# Patient Record
Sex: FEMALE | Race: BLACK OR AFRICAN AMERICAN | NOT HISPANIC OR LATINO | URBAN - METROPOLITAN AREA
[De-identification: names, ages, dates, MRNs, and addresses within clinical notes are randomized per-mention and may not be internally consistent; named-entity substitution may affect disease eponyms.]

---

## 2018-06-29 ENCOUNTER — OUTPATIENT (OUTPATIENT)
Dept: OUTPATIENT SERVICES | Facility: HOSPITAL | Age: 58
LOS: 1 days | End: 2018-06-29
Payer: COMMERCIAL

## 2018-06-29 DIAGNOSIS — R06.02 SHORTNESS OF BREATH: ICD-10-CM

## 2018-06-29 PROCEDURE — A9505: CPT

## 2018-06-29 PROCEDURE — 78452 HT MUSCLE IMAGE SPECT MULT: CPT

## 2018-06-29 PROCEDURE — 93018 CV STRESS TEST I&R ONLY: CPT

## 2018-06-29 PROCEDURE — 93017 CV STRESS TEST TRACING ONLY: CPT

## 2018-06-29 PROCEDURE — 78452 HT MUSCLE IMAGE SPECT MULT: CPT | Mod: 26

## 2018-06-29 PROCEDURE — A9500: CPT

## 2018-06-29 PROCEDURE — 93016 CV STRESS TEST SUPVJ ONLY: CPT

## 2022-04-11 ENCOUNTER — TRANSCRIPTION ENCOUNTER (OUTPATIENT)
Age: 62
End: 2022-04-11

## 2022-04-11 VITALS
OXYGEN SATURATION: 100 % | HEART RATE: 72 BPM | TEMPERATURE: 98 F | DIASTOLIC BLOOD PRESSURE: 83 MMHG | RESPIRATION RATE: 18 BRPM | WEIGHT: 293 LBS | HEIGHT: 69 IN | SYSTOLIC BLOOD PRESSURE: 148 MMHG

## 2022-04-12 ENCOUNTER — TRANSCRIPTION ENCOUNTER (OUTPATIENT)
Age: 62
End: 2022-04-12

## 2022-04-12 ENCOUNTER — RESULT REVIEW (OUTPATIENT)
Age: 62
End: 2022-04-12

## 2022-04-12 ENCOUNTER — OUTPATIENT (OUTPATIENT)
Dept: OUTPATIENT SERVICES | Facility: HOSPITAL | Age: 62
LOS: 1 days | Discharge: HOME | End: 2022-04-12
Payer: COMMERCIAL

## 2022-04-12 VITALS
HEART RATE: 65 BPM | OXYGEN SATURATION: 100 % | RESPIRATION RATE: 20 BRPM | DIASTOLIC BLOOD PRESSURE: 68 MMHG | SYSTOLIC BLOOD PRESSURE: 134 MMHG

## 2022-04-12 DIAGNOSIS — Z98.891 HISTORY OF UTERINE SCAR FROM PREVIOUS SURGERY: Chronic | ICD-10-CM

## 2022-04-12 LAB
BLD GP AB SCN SERPL QL: NEGATIVE — SIGNIFICANT CHANGE UP
RH IG SCN BLD-IMP: POSITIVE — SIGNIFICANT CHANGE UP

## 2022-04-12 PROCEDURE — 86900 BLOOD TYPING SEROLOGIC ABO: CPT

## 2022-04-12 PROCEDURE — 88305 TISSUE EXAM BY PATHOLOGIST: CPT | Mod: 26

## 2022-04-12 PROCEDURE — 88305 TISSUE EXAM BY PATHOLOGIST: CPT

## 2022-04-12 PROCEDURE — 58558 HYSTEROSCOPY BIOPSY: CPT

## 2022-04-12 PROCEDURE — 86850 RBC ANTIBODY SCREEN: CPT

## 2022-04-12 PROCEDURE — 86901 BLOOD TYPING SEROLOGIC RH(D): CPT

## 2022-04-12 DEVICE — MYOSURE TISSUE REMOVAL DEVICE REACH: Type: IMPLANTABLE DEVICE | Status: FUNCTIONAL

## 2022-04-12 DEVICE — MYOSURE TISSUE REMOVAL DEVICE XL: Type: IMPLANTABLE DEVICE | Status: FUNCTIONAL

## 2022-04-12 RX ORDER — OXYCODONE HYDROCHLORIDE 5 MG/1
10 TABLET ORAL EVERY 4 HOURS
Refills: 0 | Status: DISCONTINUED | OUTPATIENT
Start: 2022-04-12 | End: 2022-04-12

## 2022-04-12 RX ORDER — ONDANSETRON 8 MG/1
8 TABLET, FILM COATED ORAL EVERY 8 HOURS
Refills: 0 | Status: DISCONTINUED | OUTPATIENT
Start: 2022-04-12 | End: 2022-04-12

## 2022-04-12 RX ORDER — METOCLOPRAMIDE HCL 10 MG
10 TABLET ORAL EVERY 8 HOURS
Refills: 0 | Status: DISCONTINUED | OUTPATIENT
Start: 2022-04-12 | End: 2022-04-12

## 2022-04-12 RX ORDER — OXYCODONE HYDROCHLORIDE 5 MG/1
5 TABLET ORAL EVERY 4 HOURS
Refills: 0 | Status: DISCONTINUED | OUTPATIENT
Start: 2022-04-12 | End: 2022-04-12

## 2022-04-12 RX ORDER — ACETAMINOPHEN 500 MG
2 TABLET ORAL
Qty: 0 | Refills: 0 | DISCHARGE
Start: 2022-04-12

## 2022-04-12 RX ORDER — SIMETHICONE 80 MG/1
80 TABLET, CHEWABLE ORAL EVERY 6 HOURS
Refills: 0 | Status: DISCONTINUED | OUTPATIENT
Start: 2022-04-12 | End: 2022-04-12

## 2022-04-12 RX ORDER — ATENOLOL AND CHLORTHALIDONE 50; 25 MG/1; MG/1
1 TABLET ORAL
Qty: 0 | Refills: 0 | DISCHARGE

## 2022-04-12 RX ORDER — SODIUM CHLORIDE 9 MG/ML
1000 INJECTION, SOLUTION INTRAVENOUS
Refills: 0 | Status: DISCONTINUED | OUTPATIENT
Start: 2022-04-12 | End: 2022-04-12

## 2022-04-12 RX ORDER — KETOROLAC TROMETHAMINE 30 MG/ML
30 SYRINGE (ML) INJECTION EVERY 6 HOURS
Refills: 0 | Status: DISCONTINUED | OUTPATIENT
Start: 2022-04-12 | End: 2022-04-12

## 2022-04-12 RX ORDER — HYDROMORPHONE HYDROCHLORIDE 2 MG/ML
0.5 INJECTION INTRAMUSCULAR; INTRAVENOUS; SUBCUTANEOUS
Refills: 0 | Status: DISCONTINUED | OUTPATIENT
Start: 2022-04-12 | End: 2022-04-12

## 2022-04-12 RX ORDER — ACETAMINOPHEN 500 MG
1000 TABLET ORAL EVERY 6 HOURS
Refills: 0 | Status: DISCONTINUED | OUTPATIENT
Start: 2022-04-12 | End: 2022-04-12

## 2022-04-12 RX ADMIN — Medication 30 MILLIGRAM(S): at 17:04

## 2022-04-12 NOTE — PACU DISCHARGE NOTE - COMMENTS
Patient denies pain at this time. IV removed as per protocol. PACU Discharge criteria met. Patient is ready for discharge. Given discharge instructions via teach back, verbalized understanding. Patient left in stable condition accompanied by family via wheelchair. Safety maintained at all times.

## 2022-04-12 NOTE — ASU DISCHARGE PLAN (ADULT/PEDIATRIC) - CALL YOUR DOCTOR IF YOU HAVE ANY OF THE FOLLOWING:
Bleeding that does not stop/Fever greater than (need to indicate Fahrenheit or Celsius)/Nausea and vomiting that does not stop/Unable to urinate/Excessive diarrhea/Inability to tolerate liquids or foods

## 2022-04-12 NOTE — DISCHARGE NOTE NURSING/CASE MANAGEMENT/SOCIAL WORK - PATIENT PORTAL LINK FT
You can access the FollowMyHealth Patient Portal offered by [facility] by registering at the following website: [email or web address]. By joining [facility]’s FollowMyHealth portal, you will also be able to view your health information using other applications (apps) compatible with our system.

## 2022-04-12 NOTE — ASU DISCHARGE PLAN (ADULT/PEDIATRIC) - NS MD DC FALL RISK RISK
For information on Fall & Injury Prevention, visit: [email or web address] OR  [email or web address] OR  https://www.cdc.gov/steadi/patient.html

## 2022-04-12 NOTE — ASU DISCHARGE PLAN (ADULT/PEDIATRIC) - CARE PROVIDER_API CALL
Gabriel Nelson)  Obstetrics and Gynecology  [address], [address]  [place], [place] [address]  Phone: [phone]  Fax: [phone]  Follow Up Time:

## 2022-04-12 NOTE — BRIEF OPERATIVE NOTE - NSICDXBRIEFPOSTOP_GEN_ALL_CORE_FT
POST-OP DIAGNOSIS:  Endometrial polyp 12-Apr-2022 16:26:57  Isidro Millan  Endocervical polyp 12-Apr-2022 16:27:13  Isidro Millan

## 2022-04-14 LAB — SURGICAL PATHOLOGY STUDY: SIGNIFICANT CHANGE UP

## 2022-11-18 PROBLEM — Z86.69 PERSONAL HISTORY OF OTHER DISEASES OF THE NERVOUS SYSTEM AND SENSE ORGANS: Chronic | Status: ACTIVE | Noted: 2022-04-11

## 2022-11-18 PROBLEM — I10 ESSENTIAL (PRIMARY) HYPERTENSION: Chronic | Status: ACTIVE | Noted: 2022-04-11

## 2022-12-12 PROBLEM — Z00.00 ENCOUNTER FOR PREVENTIVE HEALTH EXAMINATION: Status: ACTIVE | Noted: 2022-12-12

## 2022-12-19 ENCOUNTER — APPOINTMENT (OUTPATIENT)
Dept: ENDOCRINOLOGY | Facility: CLINIC | Age: 62
End: 2022-12-19

## 2022-12-19 VITALS
WEIGHT: 293 LBS | SYSTOLIC BLOOD PRESSURE: 169 MMHG | HEART RATE: 82 BPM | HEIGHT: 69 IN | BODY MASS INDEX: 43.4 KG/M2 | DIASTOLIC BLOOD PRESSURE: 91 MMHG

## 2022-12-19 DIAGNOSIS — Z82.49 FAMILY HISTORY OF ISCHEMIC HEART DISEASE AND OTHER DISEASES OF THE CIRCULATORY SYSTEM: ICD-10-CM

## 2022-12-19 PROCEDURE — 99202 OFFICE O/P NEW SF 15 MIN: CPT

## 2022-12-19 RX ORDER — ATENOLOL 50 MG/1
TABLET ORAL
Refills: 0 | Status: ACTIVE | COMMUNITY

## 2022-12-21 ENCOUNTER — TRANSCRIPTION ENCOUNTER (OUTPATIENT)
Age: 62
End: 2022-12-21

## 2023-01-06 ENCOUNTER — TRANSCRIPTION ENCOUNTER (OUTPATIENT)
Age: 63
End: 2023-01-06

## 2023-01-10 ENCOUNTER — TRANSCRIPTION ENCOUNTER (OUTPATIENT)
Age: 63
End: 2023-01-10

## 2023-01-19 ENCOUNTER — NON-APPOINTMENT (OUTPATIENT)
Age: 63
End: 2023-01-19

## 2023-01-20 ENCOUNTER — TRANSCRIPTION ENCOUNTER (OUTPATIENT)
Age: 63
End: 2023-01-20

## 2023-01-23 ENCOUNTER — TRANSCRIPTION ENCOUNTER (OUTPATIENT)
Age: 63
End: 2023-01-23

## 2023-02-01 ENCOUNTER — TRANSCRIPTION ENCOUNTER (OUTPATIENT)
Age: 63
End: 2023-02-01

## 2023-02-01 NOTE — ASSESSMENT
[FreeTextEntry1] : 62 year old female with PMH of HTN (atenolol) referred from PCP for obesity/weight management. \par \par 317lbs, BMI 47.\par Labs today.\par Trial of Ozempic. Samples provided. Consider switching to Wegovy if effective and tolerating at next visit, if Wegovy is back in supply and covered by plan.\par Referred to RD.\par \par RTO 2 months.\par Education:\par OZEMPIC may cause serious side effects, including:  Possible thyroid tumors, including cancer  \par The most common side effects of OZEMPIC may include nausea, vomiting, diarrhea, stomach (abdominal) pain and constipation. 	\par  Less common, but more serious side effects: inflammation of your pancreas (pancreatitis). Stop using OZEMPIC and call your healthcare provider right away if you have severe pain in your stomach area (abdomen) that will not go away, with or without vomiting. You may feel the pain from your abdomen to your back. \par Tell your healthcare provider if you have changes in vision, low blood sugar (hypoglycemia). \par \par Medication administration:\par Start by checking your pen to make sure that it contains Ozempic, then look at the pictures below to get to know the different parts of your pen and needle. Your pen is a prefilled dial-a-dose pen. It contains 2 mg of semaglutide, and you can select doses of 0.25 mg or 0.5 mg. Your pen is made to be used with NovoFine® Plus or NovoFine® disposable needles up to a length of 8 mm. NovoFine® Plus 32G 4 mm disposable needles are enclosed. Always use a new needle for each injection. 	\par Step 1:\par  --Wash your hands with soap and water. Check the name and colored label of your pen, to make sure that it contains Ozempic. This is especially important if you take more than 1 type of medicine. Pull off the pen cap. Check that Ozempic in your pen is clear and colorless.   	\par  --Take a new needle, and tear off the paper tab.  	\par  Push the needle straight onto the pen. Turn until it is on tight.  	\par  Pull off the outer needle cap. Do not throw it away.  \par Step 2:	\par --Check the Ozempic flow with each new pen 	\par Turn the dose selector until the dose counter shows the flow check symbol (..-).  Hold the pen with the needle pointing up.   Press and hold in the dose button until the dose counter shows 0. The 0 must line up with the dose pointer.  A drop of Ozempic will appear at the needle tip. If no drop appears, repeat Step 2 above up to 6 times.  \par Step 3:\par --Inject Medication	\par  Continue turning the dose selector until the dose counter shows your dose (0.25 mg or 0.5 mg).  	\par  Choose your injection site and wipe the skin with an alcohol swab. Let the injection site dry before you inject your dose.  	\par  Insert the needle into your skin as your healthcare provider has shown you. Make sure you can see the dose counter.  	\par  Press and hold down the dose button until the dose counter shows 0.  	\par  Keep the needle in your skin after the dose counter has returned to 0 and count slowly to 6.  	\par  Remove the needle from your skin.  	\par  Always watch the dose counter to know how many mg you inject. Hold the dose button down until the dose counter shows 0. 	\par  Carefully remove the needle from the pen.  	\par  Place the needle in a sharps container  	\par  Put the pen cap on your pen after each use to protect Ozempic from light.  	\par

## 2023-02-01 NOTE — ADDENDUM
[FreeTextEntry1] : 1/9/2023 AL\ronald Reviewed labs with patient over the phone.\par Hormone evaluation normal.\par A1C 5.7%. No family history of diabetes. Cholesterol slightly elevated.\par She has taken 3 doses of ozempic with mild SE, that have resolved. Appetite has decreased. With limited appetite, advised to ensure food eaten is nutritious and contains protein and fiber. Has RD appt. scheduled. Suspect A1C and cholesterol will improved with weight loss. Will see if Wegovy will be covered and supply available.\par RTO 4 weeks.\par \par 2/1/2023 AL\par Wegovy Approved thru 8/25/2023  #[id number].

## 2023-02-01 NOTE — HISTORY OF PRESENT ILLNESS
[FreeTextEntry1] : 62 year old female with PMH of HTN (atenolol) referred from PCP for obesity/weight management.\par \par She was not overweight as a child. She started gaining weight after pregnancy. She is at her highest weight now. 317lbs, BMI 47.\par She was seen by endocrinologist Dr Wheatley at age 47. Found to have insulin resistance. No pharmaceutic therapy at that time. Was advised to decrease carb intake. She endorses emotional eating. Has never tried a formal weight loss program like Noom or weight watchers, as she prefers to not measure portions.\par \par Diet: Breakfast - eggs, berries or 1/2 banana OR occasional corn muffin. Lunch - past - heaviest meal. Dinner - protein and salad. \par Exercise: 1hr 3x/week at gym - step aerobics and leg work with weights. \par \par FHx: no DM, thyroid, autoimmune. + HTN. Her  has DM and on Ozempic.

## 2023-02-02 ENCOUNTER — TRANSCRIPTION ENCOUNTER (OUTPATIENT)
Age: 63
End: 2023-02-02

## 2023-02-21 ENCOUNTER — APPOINTMENT (OUTPATIENT)
Dept: ENDOCRINOLOGY | Facility: CLINIC | Age: 63
End: 2023-02-21
Payer: COMMERCIAL

## 2023-02-21 VITALS
BODY MASS INDEX: 43.4 KG/M2 | SYSTOLIC BLOOD PRESSURE: 167 MMHG | DIASTOLIC BLOOD PRESSURE: 84 MMHG | HEART RATE: 86 BPM | WEIGHT: 293 LBS | HEIGHT: 69 IN

## 2023-02-21 PROCEDURE — 97802 MEDICAL NUTRITION INDIV IN: CPT

## 2023-02-21 PROCEDURE — 99213 OFFICE O/P EST LOW 20 MIN: CPT

## 2023-02-21 NOTE — HISTORY OF PRESENT ILLNESS
[FreeTextEntry1] : Ms. KINNEY is a 62 year female with pmhx of obesity, HTN who presents for follow-up.\par \par Last (initial) visit 12/19/2022 with Lea Longoria NP\par \par Interval change:\par Since last visit, wegovy PA obtained\par Was provided ozempic samples and started trial (12/24/22 initiation) with this first, titrating to 1mg/weekly dose (increased last saturday)\par Experiencing some upset with starting and then dose tiration that can last for 1.5 days after injection\par Was initially constipation as well, has increased fiber, which has helped\par Since last visit, has lost 12 lb from our scale, per patient closer to 8 based on home scales (starting weight 313)\par Saw nutritionist today\par \par \par Current regimen:\par \par 1. Obesity\par Yamila was not overweight as a child.  Began to gain weight after pregnancy.\par The patient's current BMI is: 45.04\par \par Previoulsy followed endocrinologist, Dr. Wheatley (when 47), dx with insulin resistance at that time.\par No medication prescribed, was advised dietary measures\par Endorses emotional eating\par \par Medications that increase body weight: NONE (antipsychotics, lithium, paroxetine, mirtazepine, valproate, gabapentin, carbamazepine, anti-histamines, B-blockers, glucocorticoids, progestins)\par \par Complications related to obesity: prediabetes (diabetes, prediabetes, metabolic syndrome, NAFLD, CHERELLE, dyslipidemia, HTN, osteoarthritis, depression)\par \par The patient has tried the following in attempts to lose weight: \par - dietary measures\par - not past formal weight loss program\par \par Current exercise: \par - exercising 3 days weekly\par \par That patient has met with nutritionist to discuss lifestyle changes\par

## 2023-02-21 NOTE — ASSESSMENT
[FreeTextEntry1] : 1. Obesity\par Obesity, class III\par Current weight 305 with BMI 45.04\par Highest weight 317 lb (BMI 47) in 12/2022\par SHANNEN has made dietary attempts, as well as exercise attempts for weight loss with limited benefit.  She initiated semaglutide (ozempic) since last visit and has titrated to 1mg as of this past week with +15lb weight loss and overall tolerance.  She does experience mild GI SE the 1-2 days after dose adjustment.\par -- plan to switch to wegovy 1.7mg/weekly after completing 4 weeks of 1mg regimen\par -- after 4 weeks of wegovy 1.7mg/weekly, to increase to maintenance dose of 2.4mg/weekly\par -- continue dietary modifications and increased physical activity\par \par Follow-up in 3 months, or sooner, as needed \par \par Elizabeth Gann, MS, FNP-BC, [facility]\par 02/21/2023\par

## 2023-03-15 ENCOUNTER — TRANSCRIPTION ENCOUNTER (OUTPATIENT)
Age: 63
End: 2023-03-15

## 2023-03-28 ENCOUNTER — TRANSCRIPTION ENCOUNTER (OUTPATIENT)
Age: 63
End: 2023-03-28

## 2023-07-04 ENCOUNTER — NON-APPOINTMENT (OUTPATIENT)
Age: 63
End: 2023-07-04

## 2023-07-05 ENCOUNTER — NON-APPOINTMENT (OUTPATIENT)
Age: 63
End: 2023-07-05

## 2023-07-06 ENCOUNTER — TRANSCRIPTION ENCOUNTER (OUTPATIENT)
Age: 63
End: 2023-07-06

## 2023-07-07 ENCOUNTER — TRANSCRIPTION ENCOUNTER (OUTPATIENT)
Age: 63
End: 2023-07-07

## 2023-08-28 ENCOUNTER — TRANSCRIPTION ENCOUNTER (OUTPATIENT)
Age: 63
End: 2023-08-28

## 2023-08-30 ENCOUNTER — RX CHANGE (OUTPATIENT)
Age: 63
End: 2023-08-30

## 2023-08-30 ENCOUNTER — APPOINTMENT (OUTPATIENT)
Dept: ENDOCRINOLOGY | Facility: CLINIC | Age: 63
End: 2023-08-30
Payer: COMMERCIAL

## 2023-08-30 VITALS
HEIGHT: 69 IN | DIASTOLIC BLOOD PRESSURE: 85 MMHG | BODY MASS INDEX: 41.47 KG/M2 | WEIGHT: 280 LBS | SYSTOLIC BLOOD PRESSURE: 135 MMHG | HEART RATE: 80 BPM

## 2023-08-30 DIAGNOSIS — E01.0 IODINE-DEFICIENCY RELATED DIFFUSE (ENDEMIC) GOITER: ICD-10-CM

## 2023-08-30 DIAGNOSIS — I10 ESSENTIAL (PRIMARY) HYPERTENSION: ICD-10-CM

## 2023-08-30 PROCEDURE — 99213 OFFICE O/P EST LOW 20 MIN: CPT

## 2023-08-30 RX ORDER — SEMAGLUTIDE 0.25 MG/.5ML
0.25 INJECTION, SOLUTION SUBCUTANEOUS
Qty: 1 | Refills: 1 | Status: DISCONTINUED | COMMUNITY
Start: 2023-01-09 | End: 2023-08-30

## 2023-08-30 RX ORDER — SEMAGLUTIDE 1.7 MG/.75ML
1.7 INJECTION, SOLUTION SUBCUTANEOUS
Qty: 1 | Refills: 0 | Status: DISCONTINUED | COMMUNITY
Start: 2023-02-21 | End: 2023-08-30

## 2023-08-30 RX ORDER — SEMAGLUTIDE 1.34 MG/ML
2 INJECTION, SOLUTION SUBCUTANEOUS
Qty: 2 | Refills: 0 | Status: DISCONTINUED | OUTPATIENT
Start: 2022-12-19 | End: 2023-08-30

## 2023-08-30 NOTE — DATA REVIEWED
[FreeTextEntry1] : 8/25/23 gluc 87, creat 0.69, GFR 98, AST/ALT 16/15 A1C 5.4% chol 206, HDL 63, , Tg 73 TSH 1.14 CBC WNL vitD 31

## 2023-08-30 NOTE — PHYSICAL EXAM
[Alert] : alert [No Acute Distress] : no acute distress [Normal Voice/Communication] : normal voice communication [No Neck Mass] : no neck mass was observed [No LAD] : no lymphadenopathy [No Thyroid Nodules] : no palpable thyroid nodules [No Respiratory Distress] : no respiratory distress [Normal Rate and Effort] : normal respiratory rate and effort [Clear to Auscultation] : lungs were clear to auscultation bilaterally [Normal Rate] : heart rate was normal [Regular Rhythm] : with a regular rhythm [Normal Gait] : normal gait [Oriented x3] : oriented to person, place, and time [Normal Affect] : the affect was normal [Normal Insight/Judgement] : insight and judgment were intact [Normal Mood] : the mood was normal [de-identified] : mild thyroid fullness on exam, nontender to palpation

## 2023-08-30 NOTE — HISTORY OF PRESENT ILLNESS
[FreeTextEntry1] : Ms. KINNEY is a 62 year female with pmhx of obesity, HTN who presents for follow-up.  Previously following Lea Longoria, last visit 12/19/2022 Last visit, 2/21/2023 with myself  Interval change: Since last visit, has titrated wegovy to 2.4mg/weekly, tolerating current regimen overall well, but reports to nausea for a few hours after injection that resolves weekly after just a few hours with no vomiting, abdominal pain or constipation/diarrhea On cruise last week, some nausea with diet excursions, otherwise typically only for a few hours after injection Has lost 25 lb since last visit for total of 33lb weight loss since initiating semaglutide regimen Labs from 8/25/23 with PCP, reviewed with patient, as below Clothes fitting better, decreasing sizes  Current regimen: Wegovy 2.4 mg/weekly  1. Arian Driscoll was not overweight as a child.  Began to gain weight after pregnancy. Starting weight prior to semaglutide initiation: 313lb, BMI 46.81 Current weight of 280 lb with BMI of 41.35 and total weight loss of 10.5% of TBW  Previously followed endocrinologist, Dr. Wheatley (when 47), dx with insulin resistance at that time. No medication prescribed, was advised dietary measures Endorses emotional eating  Medications that increase body weight: NONE (antipsychotics, lithium, paroxetine, mirtazepine, valproate, gabapentin, carbamazepine, anti-histamines, B-blockers, glucocorticoids, progestins)  Complications related to obesity: prediabetes (diabetes, prediabetes, metabolic syndrome, NAFLD, CHERELLE, dyslipidemia, HTN, osteoarthritis, depression)  The patient has tried the following in attempts to lose weight:  - dietary measures - not past formal weight loss program  Current exercise:  - exercising 3 days weekly  That patient has met with nutritionist to discuss lifestyle changes  2. Prediabetes A1C 5.4% (8/25/2023) from 5.7%

## 2023-08-30 NOTE — REVIEW OF SYSTEMS
[Dysphagia] : no dysphagia [Neck Pain] : no neck pain [As Noted in HPI] : as noted in HPI [Negative] : Heme/Lymph

## 2023-08-30 NOTE — ASSESSMENT
[FreeTextEntry1] : 1. Obesity Obesity, class III Current weight of 280 lb with BMI of 41.35  Highest weight 317 lb (BMI 47) in 12/2022 SHANNEN has made dietary attempts, as well as exercise attempts for weight loss with limited benefit.  Since initiating semaglutide, she has achieved 33lb weight loss for a total loss of 10.5% of TBW with improving cardiometabolic parameters and has been able to increase physical activity with increased tolerance which she attributes to weight loss. Current regimen: Wegovy 2.4mg/weekly -- continue wegovy 2.4mg/weekly -- continue dietary modifications and increased physical activity  2. HTN BP at goal today  3. Prediabetes A1C improved to 5.4% (8/25/23) from 5.7%  4. thyromegaly Mild fullness appreciated on exam Euthyroid on labs from 8/25/23 Patient endorses concern of thyroid gland anatomy given husbands h/o thyroid cancer and would like to proceed with thyroid US at this time -- provided RX for thyroid US  Refills sent Schedule thyroid US, I will call/[facility] message with results Follow-up in 3-6 months, or sooner, as needed   Elizabeth Gann MS, [facility]-BC, [facility] 08/30/2023

## 2024-04-08 ENCOUNTER — APPOINTMENT (OUTPATIENT)
Dept: ENDOCRINOLOGY | Facility: CLINIC | Age: 64
End: 2024-04-08
Payer: COMMERCIAL

## 2024-04-08 VITALS
DIASTOLIC BLOOD PRESSURE: 82 MMHG | WEIGHT: 277 LBS | SYSTOLIC BLOOD PRESSURE: 152 MMHG | HEART RATE: 82 BPM | HEIGHT: 69 IN | BODY MASS INDEX: 41.03 KG/M2

## 2024-04-08 DIAGNOSIS — R73.03 PREDIABETES.: ICD-10-CM

## 2024-04-08 DIAGNOSIS — E66.01 MORBID (SEVERE) OBESITY DUE TO EXCESS CALORIES: ICD-10-CM

## 2024-04-08 PROCEDURE — 99213 OFFICE O/P EST LOW 20 MIN: CPT

## 2024-04-08 RX ORDER — SEMAGLUTIDE 2.4 MG/.75ML
2.4 INJECTION, SOLUTION SUBCUTANEOUS
Qty: 9 | Refills: 2 | Status: ACTIVE | COMMUNITY
Start: 2023-02-21 | End: 1900-01-01

## 2024-04-08 NOTE — HISTORY OF PRESENT ILLNESS
[FreeTextEntry1] : Ms. KINNEY is a 62 year female with pmhx of obesity, HTN who presents for follow-up.  Previously following Lea Longoria, last visit 12/19/2022 Last visit, 8/30/2023 with myself  Interval change: Since last visit, has continued wegovy to 2.4mg/weekly Has lost 5lb since last visit for total of 42lb weight loss since initiating semaglutide regimen Clothes fitting better, decreasing "so many sizes" Feels well Believes that she can make more lifestyle changes to continue to lose weight  Current regimen: Wegovy 2.4 mg/weekly  1. Obesity Yamila was not overweight as a child.  Began to gain weight after pregnancy. Starting weight prior to semaglutide initiation: 317lb, BMI 46.81 Current weight of 275 lb with BMI of 40.61 and total weight loss of 42lb, a 13.25% of TBW  Complications related to obesity: prediabetes (diabetes, prediabetes, metabolic syndrome, NAFLD, CHERELLE, dyslipidemia, HTN, osteoarthritis, depression)  The patient has tried the following in attempts to lose weight:  - dietary measures - not past formal weight loss program - medical management: Currently with Wegovy 2.4mg/weekly  Current exercise:  - exercising 3 days weekly That patient has met with nutritionist to discuss lifestyle changes  2. Prediabetes A1C 5.4% (8/25/2023) from 5.7%

## 2024-04-08 NOTE — ASSESSMENT
[FreeTextEntry1] : 1. Obesity Obesity, class III Current weight of 275 lb with BMI of 40.61 Highest weight 317 lb (BMI 47) in 12/2022 SHANNEN has made dietary attempts, as well as exercise attempts for weight loss and continues medical management with Wegovy of 2.4mg/weekly Since initiating semaglutide, she has achieved 42lb weight loss for a total loss of 13.25% of TBW with improving cardiometabolic parameters and has been able to increase physical activity with increased tolerance which she attributes to weight loss. Current regimen: Wegovy 2.4mg/weekly -- continue wegovy 2.4mg/weekly -- continue dietary modifications and increased physical activity  2. HTN BP above goal today  3. Prediabetes A1C improved to 5.4% (8/25/23) from 5.7% -- repeat with next visit, if not performed with PCP prior to this visit  4. thyromegaly Mild fullness appreciated on exam in past Euthyroid on labs from 8/25/23 Patient endorses concern of thyroid gland anatomy given husbands h/o thyroid cancer, did not proceed with thyroid US previously provided, still contemplative to have performed  Refills sent Follow-up in 3-4 months, or sooner, as needed  Elizabeth Gann MS, FN-BC, [facility] 04/08/2024

## 2024-04-08 NOTE — REVIEW OF SYSTEMS
[Nausea] : no nausea [Constipation] : no constipation [Abdominal Pain] : no abdominal pain [Diarrhea] : no diarrhea [Polydipsia] : no polydipsia [Negative] : Neurological

## 2024-04-08 NOTE — PHYSICAL EXAM
[Alert] : alert [Obese] : obese [No Acute Distress] : no acute distress [Normal Voice/Communication] : normal voice communication [No Neck Mass] : no neck mass was observed [No LAD] : no lymphadenopathy [Thyroid Not Enlarged] : the thyroid was not enlarged [No Thyroid Nodules] : no palpable thyroid nodules [No Respiratory Distress] : no respiratory distress [Normal Rate and Effort] : normal respiratory rate and effort [Clear to Auscultation] : lungs were clear to auscultation bilaterally [Normal Rate] : heart rate was normal [Regular Rhythm] : with a regular rhythm [Normal Gait] : normal gait [Oriented x3] : oriented to person, place, and time [Normal Affect] : the affect was normal [Normal Insight/Judgement] : insight and judgment were intact [Normal Mood] : the mood was normal

## 2024-07-25 ENCOUNTER — APPOINTMENT (OUTPATIENT)
Dept: ENDOCRINOLOGY | Facility: CLINIC | Age: 64
End: 2024-07-25
Payer: COMMERCIAL

## 2024-07-25 VITALS
HEART RATE: 80 BPM | SYSTOLIC BLOOD PRESSURE: 147 MMHG | DIASTOLIC BLOOD PRESSURE: 90 MMHG | WEIGHT: 276 LBS | BODY MASS INDEX: 40.76 KG/M2

## 2024-07-25 DIAGNOSIS — E66.01 MORBID (SEVERE) OBESITY DUE TO EXCESS CALORIES: ICD-10-CM

## 2024-07-25 DIAGNOSIS — R73.03 PREDIABETES.: ICD-10-CM

## 2024-07-25 PROCEDURE — 99213 OFFICE O/P EST LOW 20 MIN: CPT

## 2024-07-25 RX ORDER — TIRZEPATIDE 7.5 MG/.5ML
7.5 INJECTION, SOLUTION SUBCUTANEOUS
Qty: 6 | Refills: 1 | Status: ACTIVE | COMMUNITY
Start: 2024-07-25 | End: 1900-01-01

## 2024-07-25 NOTE — ASSESSMENT
[FreeTextEntry1] : 1. Obesity Obesity, class III Current weight of 276 lb with BMI of 40.76 Highest weight 317 lb (BMI 47) in 12/2022 SHANNEN has made dietary attempts, as well as exercise attempts for weight loss and continues medical management with Wegovy of 2.4mg/weekly Since initiating semaglutide, she has achieved 42lb weight loss for a total loss of 13.3% of TBW with improving cardiometabolic parameters and has been able to increase physical activity with increased tolerance which she attributes to weight loss.  She is currently at weight loss plateau despite increasing physical activity/diet modifications on her current wegovy regimen and today she desires trial to switch to Zepbound 7.5mg/weekly Current regimen: Wegovy 2.4mg/weekly -- after finishing wegovy 2.4mg/weekly pens (has 1-2 month supply at home), to switch to zepbound 7.5mg/weekly  -- continue dietary modifications and increased physical activity  2. HTN BP above goal today  3. Prediabetes A1C 5.7% (7/25/24) from 5.4% (8/25/23) from 5.7% -- continue lifestyle modification and GLP1ra  4. thyromegaly Mild fullness appreciated on exam in past Euthyroid on labs from 8/25/23 Patient endorses concern of thyroid gland anatomy given husbands h/o thyroid cancer, did not proceed with thyroid US previously provided, still contemplative to have performed   Follow-up in 4-6months  Elizabeth Gann MS, FN-BC, [facility] 07/25/2024

## 2024-07-25 NOTE — PHYSICAL EXAM
[Alert] : alert [Obese] : obese [No Acute Distress] : no acute distress [Normal Voice/Communication] : normal voice communication [No Neck Mass] : no neck mass was observed [No LAD] : no lymphadenopathy [Thyroid Not Enlarged] : the thyroid was not enlarged [No Thyroid Nodules] : no palpable thyroid nodules [No Respiratory Distress] : no respiratory distress [Normal Rate and Effort] : normal respiratory rate and effort [Clear to Auscultation] : lungs were clear to auscultation bilaterally [Normal Rate] : heart rate was normal [Regular Rhythm] : with a regular rhythm [Normal Gait] : normal gait [Oriented x3] : oriented to person, place, and time [Normal Affect] : the affect was normal [Normal Insight/Judgement] : insight and judgment were intact [Normal Mood] : the mood was normal [de-identified] : Thyroid nontender to palpation

## 2024-07-25 NOTE — REVIEW OF SYSTEMS
[Heartburn] : heartburn [Negative] : Neurological [Nausea] : no nausea [Constipation] : no constipation [Abdominal Pain] : no abdominal pain [Vomiting] : no vomiting [Diarrhea] : no diarrhea [Polyuria] : no polyuria [Polydipsia] : no polydipsia [FreeTextEntry4] : + Postnasal drip

## 2024-07-25 NOTE — HISTORY OF PRESENT ILLNESS
[FreeTextEntry1] : Ms. KINNEY is a 63 year female with pmhx of obesity, HTN who presents for follow-up.  Previously following Lea Longoria, last visit 12/19/2022 Last visit, 4/8/2024 with myself  Interval change: Since last visit, has continued wegovy to 2.4mg/weekly No weight loss since last visit, but has maintained 42lb weight loss since initiating semaglutide regimen (13.3% of TBW) Clothing sizes still decreased Mild acid reflux following food triggers.  Has PND, allergies and GERD per ENT eval. Feels well Has increased physical activity, walking 20 blocks to commute work and working out 3x/weekly with steps (now 4-5x/weekly).  Finds more mental clarity   Current regimen: Wegovy 2.4 mg/weekly  1. Obesity Yamila was not overweight as a child.  Began to gain weight after pregnancy. Starting weight prior to semaglutide initiation: 317lb, BMI 46.81 Current weight of 275 lb with BMI of 40.61 and total weight loss of 42lb, a 13.25% of TBW  Complications related to obesity: prediabetes (diabetes, prediabetes, metabolic syndrome, NAFLD, CHERELLE, dyslipidemia, HTN, osteoarthritis, depression)  The patient has tried the following in attempts to lose weight:  - dietary measures - not past formal weight loss program - medical management: Currently with Wegovy 2.4mg/weekly  Current exercise:  - exercising 3 days weekly That patient has met with nutritionist to discuss lifestyle changes  2. Prediabetes A1C 5.7% (5/28/24) from 5.4% (8/25/2023) from 5.7%

## 2024-07-25 NOTE — DATA REVIEWED
[FreeTextEntry1] : 5/28/24 A1C 5.7% gluc 91, Creat 0.61, , potassium 3.4, AST/ALT 17/18 Chol 210, HDL 69, , Tg 89 CBC WNL  8/25/23 gluc 87, creat 0.69, GFR 98, AST/ALT 16/15 A1C 5.4% chol 206, HDL 63, , Tg 73 TSH 1.14 CBC WNL vitD 31

## 2024-07-25 NOTE — PHYSICAL EXAM
[Alert] : alert [Obese] : obese [No Acute Distress] : no acute distress [Normal Voice/Communication] : normal voice communication [No Neck Mass] : no neck mass was observed [No LAD] : no lymphadenopathy [Thyroid Not Enlarged] : the thyroid was not enlarged [No Thyroid Nodules] : no palpable thyroid nodules [No Respiratory Distress] : no respiratory distress [Normal Rate and Effort] : normal respiratory rate and effort [Clear to Auscultation] : lungs were clear to auscultation bilaterally [Normal Rate] : heart rate was normal [Regular Rhythm] : with a regular rhythm [Normal Gait] : normal gait [Oriented x3] : oriented to person, place, and time [Normal Affect] : the affect was normal [Normal Insight/Judgement] : insight and judgment were intact [Normal Mood] : the mood was normal [de-identified] : Thyroid nontender to palpation

## 2024-07-25 NOTE — ASSESSMENT
[FreeTextEntry1] : 1. Obesity Obesity, class III Current weight of 276 lb with BMI of 40.76 Highest weight 317 lb (BMI 47) in 12/2022 SHANNEN has made dietary attempts, as well as exercise attempts for weight loss and continues medical management with Wegovy of 2.4mg/weekly Since initiating semaglutide, she has achieved 42lb weight loss for a total loss of 13.3% of TBW with improving cardiometabolic parameters and has been able to increase physical activity with increased tolerance which she attributes to weight loss.  She is currently at weight loss plateau despite increasing physical activity/diet modifications on her current wegovy regimen and today she desires trial to switch to Zepbound 7.5mg/weekly Current regimen: Wegovy 2.4mg/weekly -- after finishing wegovy 2.4mg/weekly pens (has 1-2 month supply at home), to switch to zepbound 7.5mg/weekly  -- continue dietary modifications and increased physical activity  2. HTN BP above goal today  3. Prediabetes A1C 5.7% (7/25/24) from 5.4% (8/25/23) from 5.7% -- continue lifestyle modification and GLP1ra  4. thyromegaly Mild fullness appreciated on exam in past Euthyroid on labs from 8/25/23 Patient endorses concern of thyroid gland anatomy given husbands h/o thyroid cancer, did not proceed with thyroid US previously provided, still contemplative to have performed   Follow-up in 4-6months  Elizabeth Gann MS, FN-BC, [facility] 07/25/2024

## 2024-12-19 ENCOUNTER — APPOINTMENT (OUTPATIENT)
Dept: ENDOCRINOLOGY | Facility: CLINIC | Age: 64
End: 2024-12-19

## 2025-01-09 ENCOUNTER — TRANSCRIPTION ENCOUNTER (OUTPATIENT)
Age: 65
End: 2025-01-09

## 2025-01-10 ENCOUNTER — TRANSCRIPTION ENCOUNTER (OUTPATIENT)
Age: 65
End: 2025-01-10

## 2025-01-14 ENCOUNTER — TRANSCRIPTION ENCOUNTER (OUTPATIENT)
Age: 65
End: 2025-01-14

## 2025-01-27 ENCOUNTER — TRANSCRIPTION ENCOUNTER (OUTPATIENT)
Age: 65
End: 2025-01-27

## 2025-01-28 ENCOUNTER — TRANSCRIPTION ENCOUNTER (OUTPATIENT)
Age: 65
End: 2025-01-28

## (undated) DEVICE — DRAPE TOWEL BLUE 17" X 24"

## (undated) DEVICE — TUBING TUR 2 PRONG

## (undated) DEVICE — MYOSURE CANISTER AQUILEX KIT

## (undated) DEVICE — ELCTR PLASMA LOOP MEDIUM ANGLED 24FR 12-30 DEG

## (undated) DEVICE — MYOSURE SCOPE SEAL

## (undated) DEVICE — DRAPE IRRIGATION POUCH 19X23"

## (undated) DEVICE — GLV 8 PROTEXIS (WHITE)

## (undated) DEVICE — TUBING AQUILEX OUTFLOW

## (undated) DEVICE — PACK D&C

## (undated) DEVICE — DRSG TELFA 3 X 8

## (undated) DEVICE — MYOSURE SOCK

## (undated) DEVICE — VENODYNE/SCD SLEEVE CALF MEDIUM

## (undated) DEVICE — TUBING AQUILEX INFLOW

## (undated) DEVICE — PRESSURE INFUSOR BAG 3000ML

## (undated) DEVICE — GOWN TRIMAX XXL

## (undated) DEVICE — WARMING BLANKET UPPER ADULT

## (undated) DEVICE — SOL IRR BAG NS 0.9% 3000ML